# Patient Record
Sex: FEMALE | Race: OTHER | HISPANIC OR LATINO | URBAN - METROPOLITAN AREA
[De-identification: names, ages, dates, MRNs, and addresses within clinical notes are randomized per-mention and may not be internally consistent; named-entity substitution may affect disease eponyms.]

---

## 2020-09-30 ENCOUNTER — EMERGENCY (EMERGENCY)
Facility: HOSPITAL | Age: 40
LOS: 1 days | Discharge: HOME | End: 2020-09-30
Attending: EMERGENCY MEDICINE | Admitting: EMERGENCY MEDICINE
Payer: MEDICAID

## 2020-09-30 VITALS
RESPIRATION RATE: 18 BRPM | HEIGHT: 63 IN | TEMPERATURE: 97 F | OXYGEN SATURATION: 100 % | DIASTOLIC BLOOD PRESSURE: 76 MMHG | SYSTOLIC BLOOD PRESSURE: 131 MMHG | HEART RATE: 67 BPM

## 2020-09-30 LAB
APTT BLD: 32.7 SEC — SIGNIFICANT CHANGE UP (ref 27–36.3)
BASE EXCESS BLDV CALC-SCNC: 3.3 MMOL/L — SIGNIFICANT CHANGE UP
BASOPHILS # BLD AUTO: 0.03 K/UL — SIGNIFICANT CHANGE UP (ref 0–0.2)
BASOPHILS NFR BLD AUTO: 0.3 % — SIGNIFICANT CHANGE UP (ref 0–2)
BLOOD GAS VENOUS - CREATININE: 0.68 MG/DL — SIGNIFICANT CHANGE UP (ref 0.5–1.3)
CHLORIDE BLDV-SCNC: 107 MMOL/L — SIGNIFICANT CHANGE UP (ref 96–108)
EOSINOPHIL # BLD AUTO: 0.13 K/UL — SIGNIFICANT CHANGE UP (ref 0–0.5)
EOSINOPHIL NFR BLD AUTO: 1.2 % — SIGNIFICANT CHANGE UP (ref 0–6)
GAS PNL BLDV: 136 MMOL/L — SIGNIFICANT CHANGE UP (ref 136–146)
GLUCOSE BLDV-MCNC: 103 MG/DL — HIGH (ref 70–99)
HCG SERPL-ACNC: < 5 MIU/ML — SIGNIFICANT CHANGE UP
HCO3 BLDV-SCNC: 25 MMOL/L — SIGNIFICANT CHANGE UP (ref 20–27)
HCT VFR BLD CALC: 40.8 % — SIGNIFICANT CHANGE UP (ref 34.5–45)
HCT VFR BLDV CALC: 41.3 % — SIGNIFICANT CHANGE UP (ref 34.5–45)
HGB BLD-MCNC: 12.7 G/DL — SIGNIFICANT CHANGE UP (ref 11.5–15.5)
HGB BLDV-MCNC: 13.4 G/DL — SIGNIFICANT CHANGE UP (ref 11.5–15.5)
IMM GRANULOCYTES NFR BLD AUTO: 0.3 % — SIGNIFICANT CHANGE UP (ref 0–1.5)
INR BLD: 1.07 — SIGNIFICANT CHANGE UP (ref 0.88–1.16)
LACTATE BLDV-MCNC: 1.4 MMOL/L — SIGNIFICANT CHANGE UP (ref 0.5–2)
LYMPHOCYTES # BLD AUTO: 2.44 K/UL — SIGNIFICANT CHANGE UP (ref 1–3.3)
LYMPHOCYTES # BLD AUTO: 22.8 % — SIGNIFICANT CHANGE UP (ref 13–44)
MCHC RBC-ENTMCNC: 26.8 PG — LOW (ref 27–34)
MCHC RBC-ENTMCNC: 31.1 % — LOW (ref 32–36)
MCV RBC AUTO: 86.3 FL — SIGNIFICANT CHANGE UP (ref 80–100)
MONOCYTES # BLD AUTO: 0.53 K/UL — SIGNIFICANT CHANGE UP (ref 0–0.9)
MONOCYTES NFR BLD AUTO: 4.9 % — SIGNIFICANT CHANGE UP (ref 2–14)
NEUTROPHILS # BLD AUTO: 7.56 K/UL — HIGH (ref 1.8–7.4)
NEUTROPHILS NFR BLD AUTO: 70.5 % — SIGNIFICANT CHANGE UP (ref 43–77)
NRBC # FLD: 0 K/UL — SIGNIFICANT CHANGE UP (ref 0–0)
PCO2 BLDV: 49 MMHG — SIGNIFICANT CHANGE UP (ref 41–51)
PH BLDV: 7.38 PH — SIGNIFICANT CHANGE UP (ref 7.32–7.43)
PLATELET # BLD AUTO: 339 K/UL — SIGNIFICANT CHANGE UP (ref 150–400)
PMV BLD: 9.7 FL — SIGNIFICANT CHANGE UP (ref 7–13)
PO2 BLDV: 25 MMHG — LOW (ref 35–40)
POTASSIUM BLDV-SCNC: 3.5 MMOL/L — SIGNIFICANT CHANGE UP (ref 3.4–4.5)
PROTHROM AB SERPL-ACNC: 12.3 SEC — SIGNIFICANT CHANGE UP (ref 10.6–13.6)
RBC # BLD: 4.73 M/UL — SIGNIFICANT CHANGE UP (ref 3.8–5.2)
RBC # FLD: 13.2 % — SIGNIFICANT CHANGE UP (ref 10.3–14.5)
SAO2 % BLDV: 38.3 % — LOW (ref 60–85)
WBC # BLD: 10.72 K/UL — HIGH (ref 3.8–10.5)
WBC # FLD AUTO: 10.72 K/UL — HIGH (ref 3.8–10.5)

## 2020-09-30 PROCEDURE — 99285 EMERGENCY DEPT VISIT HI MDM: CPT

## 2020-09-30 PROCEDURE — 93971 EXTREMITY STUDY: CPT | Mod: 26,LT

## 2020-09-30 RX ORDER — KETOROLAC TROMETHAMINE 30 MG/ML
30 SYRINGE (ML) INJECTION ONCE
Refills: 0 | Status: DISCONTINUED | OUTPATIENT
Start: 2020-09-30 | End: 2020-09-30

## 2020-09-30 RX ORDER — SODIUM CHLORIDE 9 MG/ML
1000 INJECTION INTRAMUSCULAR; INTRAVENOUS; SUBCUTANEOUS ONCE
Refills: 0 | Status: COMPLETED | OUTPATIENT
Start: 2020-09-30 | End: 2020-09-30

## 2020-09-30 RX ORDER — MORPHINE SULFATE 50 MG/1
4 CAPSULE, EXTENDED RELEASE ORAL ONCE
Refills: 0 | Status: DISCONTINUED | OUTPATIENT
Start: 2020-09-30 | End: 2020-09-30

## 2020-09-30 RX ADMIN — MORPHINE SULFATE 4 MILLIGRAM(S): 50 CAPSULE, EXTENDED RELEASE ORAL at 22:50

## 2020-09-30 RX ADMIN — SODIUM CHLORIDE 2000 MILLILITER(S): 9 INJECTION INTRAMUSCULAR; INTRAVENOUS; SUBCUTANEOUS at 22:50

## 2020-09-30 NOTE — ED PROVIDER NOTE - PSH
H/O umbilical hernia repair  2013  History of cosmetic surgery  2014- skin of arms/ lergs secondary to excessive weight loss  S/P breast implant, left    S/P breast implant, right    S/P cholecystectomy

## 2020-09-30 NOTE — ED PROVIDER NOTE - OBJECTIVE STATEMENT
39 y/o F w/ PMH SLE on injectable solumedrol biweekly, h/o Chiari malformation, h/o varicose veins for which she receives injections, complicated by thrombophlebitis p/w painful L lower  extremity s/p varicose vein injection 4 days ago. L foot w/ swelling and redness which radiates proximally. Denies any numbness, paresthesias, fever, chills, chest pain, shortness of breath or cough.

## 2020-09-30 NOTE — ED PROVIDER NOTE - LOWER EXTREMITY EXAM, LEFT
+edematous redness to L lower extremity, especially in foot and ankle. Area of redness and warmth on dorsum of foot w/ mild tracking of redness of the ankle. No sensory deficits.

## 2020-09-30 NOTE — ED PROVIDER NOTE - CLINICAL SUMMARY MEDICAL DECISION MAKING FREE TEXT BOX
40 y/o F w/ leg swelling. Likely cellulitis. Will obtain labs, ultrasound, inflammatory markers and likely CDU for antibiotics and monitoring.

## 2020-09-30 NOTE — ED PROVIDER NOTE - ATTENDING CONTRIBUTION TO CARE
I performed a history and physical exam of the patient and discussed their management with the PA. I reviewed the PA's note and agree with the documented findings and plan of care. I have edited as appropriate. My medical decision making and observations are found above.  Pt s/p post sclerotherapy for varicose veins to LLE presents with LLE swelling and erythema to dorsum of foot- DVT vs cellulitis- pending labs and duplex

## 2020-09-30 NOTE — ED ADULT NURSE NOTE - NSIMPLEMENTINTERV_GEN_ALL_ED
Implemented All Fall Risk Interventions:  [place] to call system. Call bell, personal items and telephone within reach. Instruct patient to call for assistance. Room bathroom lighting operational. Non-slip footwear when patient is off stretcher. Physically safe environment: no spills, clutter or unnecessary equipment. Stretcher in lowest position, wheels locked, appropriate side rails in place. Provide visual cue, wrist band, yellow gown, etc. Monitor gait and stability. Monitor for mental status changes and reorient to person, place, and time. Review medications for side effects contributing to fall risk. Reinforce activity limits and safety measures with patient and family.

## 2020-09-30 NOTE — ED ADULT TRIAGE NOTE - CHIEF COMPLAINT QUOTE
Pt states had shots done to L leg on Friday for treatment of varicose vein and DVT and since then has been having pain and swelling to LLE. Pt noted with edema to LLE with tenderness and difficulty ambulating. Denies SOB or chest pain

## 2020-09-30 NOTE — ED ADULT NURSE NOTE - OBJECTIVE STATEMENT
Pt is a 40 year old female reporting to the ED for L ankle pain since Saturday. Pt reports welling and increased pain when moving, denies trauma or injury to are. PMH of Lupus. Pt is AOX4. Pt denies chest pain or SOB. PT respirations even an unlabored. Pt appears to be comfortable, in NAD. Pt deneis fever, chills, n/v/d. Pt denies abdominal pain, dysuria, hematuria. 18 g iv placed in right ac, labs draw, pending review, will continue to monitor.

## 2020-10-01 LAB
ALBUMIN SERPL ELPH-MCNC: 4 G/DL — SIGNIFICANT CHANGE UP (ref 3.3–5)
ALP SERPL-CCNC: 96 U/L — SIGNIFICANT CHANGE UP (ref 40–120)
ALT FLD-CCNC: 20 U/L — SIGNIFICANT CHANGE UP (ref 4–33)
ANION GAP SERPL CALC-SCNC: 13 MMO/L — SIGNIFICANT CHANGE UP (ref 7–14)
APPEARANCE UR: SIGNIFICANT CHANGE UP
AST SERPL-CCNC: 19 U/L — SIGNIFICANT CHANGE UP (ref 4–32)
BACTERIA # UR AUTO: HIGH
BILIRUB SERPL-MCNC: 0.4 MG/DL — SIGNIFICANT CHANGE UP (ref 0.2–1.2)
BILIRUB UR-MCNC: NEGATIVE — SIGNIFICANT CHANGE UP
BLOOD UR QL VISUAL: SIGNIFICANT CHANGE UP
BUN SERPL-MCNC: 10 MG/DL — SIGNIFICANT CHANGE UP (ref 7–23)
CALCIUM SERPL-MCNC: 9.1 MG/DL — SIGNIFICANT CHANGE UP (ref 8.4–10.5)
CHLORIDE SERPL-SCNC: 103 MMOL/L — SIGNIFICANT CHANGE UP (ref 98–107)
CK MB BLD-MCNC: 1.1 — SIGNIFICANT CHANGE UP (ref 0–2.5)
CK MB BLD-MCNC: 2 NG/ML — SIGNIFICANT CHANGE UP (ref 1–4.7)
CK SERPL-CCNC: 178 U/L — HIGH (ref 25–170)
CO2 SERPL-SCNC: 24 MMOL/L — SIGNIFICANT CHANGE UP (ref 22–31)
COLOR SPEC: SIGNIFICANT CHANGE UP
CREAT SERPL-MCNC: 0.62 MG/DL — SIGNIFICANT CHANGE UP (ref 0.5–1.3)
GLUCOSE SERPL-MCNC: 104 MG/DL — HIGH (ref 70–99)
GLUCOSE UR-MCNC: NEGATIVE — SIGNIFICANT CHANGE UP
HYALINE CASTS # UR AUTO: SIGNIFICANT CHANGE UP
KETONES UR-MCNC: NEGATIVE — SIGNIFICANT CHANGE UP
LEUKOCYTE ESTERASE UR-ACNC: HIGH
NITRITE UR-MCNC: NEGATIVE — SIGNIFICANT CHANGE UP
PH UR: 6 — SIGNIFICANT CHANGE UP (ref 5–8)
POTASSIUM SERPL-MCNC: 3.6 MMOL/L — SIGNIFICANT CHANGE UP (ref 3.5–5.3)
POTASSIUM SERPL-SCNC: 3.6 MMOL/L — SIGNIFICANT CHANGE UP (ref 3.5–5.3)
PROT SERPL-MCNC: 7.6 G/DL — SIGNIFICANT CHANGE UP (ref 6–8.3)
PROT UR-MCNC: NEGATIVE — SIGNIFICANT CHANGE UP
RBC CASTS # UR COMP ASSIST: HIGH (ref 0–?)
SARS-COV-2 RNA SPEC QL NAA+PROBE: SIGNIFICANT CHANGE UP
SODIUM SERPL-SCNC: 140 MMOL/L — SIGNIFICANT CHANGE UP (ref 135–145)
SP GR SPEC: 1.01 — SIGNIFICANT CHANGE UP (ref 1–1.04)
SQUAMOUS # UR AUTO: SIGNIFICANT CHANGE UP
UROBILINOGEN FLD QL: NORMAL — SIGNIFICANT CHANGE UP
WBC UR QL: HIGH (ref 0–?)

## 2020-10-01 PROCEDURE — 73590 X-RAY EXAM OF LOWER LEG: CPT | Mod: 26,LT

## 2020-10-01 PROCEDURE — 73620 X-RAY EXAM OF FOOT: CPT | Mod: 26,LT

## 2020-10-01 PROCEDURE — 99218: CPT

## 2020-10-01 PROCEDURE — 73600 X-RAY EXAM OF ANKLE: CPT | Mod: 26,LT

## 2020-10-01 RX ORDER — SODIUM CHLORIDE 9 MG/ML
1000 INJECTION INTRAMUSCULAR; INTRAVENOUS; SUBCUTANEOUS
Refills: 0 | Status: DISCONTINUED | OUTPATIENT
Start: 2020-10-01 | End: 2020-10-04

## 2020-10-01 RX ORDER — KETOROLAC TROMETHAMINE 30 MG/ML
30 SYRINGE (ML) INJECTION EVERY 8 HOURS
Refills: 0 | Status: DISCONTINUED | OUTPATIENT
Start: 2020-10-01 | End: 2020-10-02

## 2020-10-01 RX ORDER — ONDANSETRON 8 MG/1
4 TABLET, FILM COATED ORAL ONCE
Refills: 0 | Status: COMPLETED | OUTPATIENT
Start: 2020-10-01 | End: 2020-10-01

## 2020-10-01 RX ORDER — ACETAMINOPHEN 500 MG
975 TABLET ORAL ONCE
Refills: 0 | Status: COMPLETED | OUTPATIENT
Start: 2020-10-01 | End: 2020-10-01

## 2020-10-01 RX ORDER — ESCITALOPRAM OXALATE 10 MG/1
20 TABLET, FILM COATED ORAL DAILY
Refills: 0 | Status: DISCONTINUED | OUTPATIENT
Start: 2020-10-01 | End: 2020-10-04

## 2020-10-01 RX ORDER — OXYCODONE AND ACETAMINOPHEN 5; 325 MG/1; MG/1
1 TABLET ORAL EVERY 6 HOURS
Refills: 0 | Status: DISCONTINUED | OUTPATIENT
Start: 2020-10-01 | End: 2020-10-01

## 2020-10-01 RX ADMIN — OXYCODONE AND ACETAMINOPHEN 1 TABLET(S): 5; 325 TABLET ORAL at 11:29

## 2020-10-01 RX ADMIN — Medication 975 MILLIGRAM(S): at 19:01

## 2020-10-01 RX ADMIN — Medication 30 MILLIGRAM(S): at 00:41

## 2020-10-01 RX ADMIN — Medication 30 MILLIGRAM(S): at 07:45

## 2020-10-01 RX ADMIN — OXYCODONE AND ACETAMINOPHEN 1 TABLET(S): 5; 325 TABLET ORAL at 12:09

## 2020-10-01 RX ADMIN — ONDANSETRON 4 MILLIGRAM(S): 8 TABLET, FILM COATED ORAL at 05:57

## 2020-10-01 RX ADMIN — SODIUM CHLORIDE 125 MILLILITER(S): 9 INJECTION INTRAMUSCULAR; INTRAVENOUS; SUBCUTANEOUS at 06:01

## 2020-10-01 RX ADMIN — Medication 100 MILLIGRAM(S): at 07:45

## 2020-10-01 RX ADMIN — OXYCODONE AND ACETAMINOPHEN 1 TABLET(S): 5; 325 TABLET ORAL at 05:57

## 2020-10-01 RX ADMIN — Medication 100 MILLIGRAM(S): at 00:41

## 2020-10-01 RX ADMIN — Medication 975 MILLIGRAM(S): at 20:06

## 2020-10-01 RX ADMIN — Medication 100 MILLIGRAM(S): at 16:18

## 2020-10-01 RX ADMIN — SODIUM CHLORIDE 125 MILLILITER(S): 9 INJECTION INTRAMUSCULAR; INTRAVENOUS; SUBCUTANEOUS at 01:52

## 2020-10-01 RX ADMIN — Medication 30 MILLIGRAM(S): at 20:06

## 2020-10-01 RX ADMIN — SODIUM CHLORIDE 125 MILLILITER(S): 9 INJECTION INTRAMUSCULAR; INTRAVENOUS; SUBCUTANEOUS at 23:30

## 2020-10-01 RX ADMIN — ESCITALOPRAM OXALATE 20 MILLIGRAM(S): 10 TABLET, FILM COATED ORAL at 01:52

## 2020-10-01 RX ADMIN — Medication 30 MILLIGRAM(S): at 19:02

## 2020-10-01 NOTE — ED CDU PROVIDER INITIAL DAY NOTE - OBJECTIVE STATEMENT
39 y/o F w/ PMH SLE on injectable solumedrol biweekly, h/o Chiari malformation, h/o varicose veins for which she receives injections, complicated by thrombophlebitis p/w painful L lower  extremity s/p varicose vein injection 4 days ago. L foot w/ swelling and redness which radiates proximally. Denies any numbness, paresthesias, fever, chills, chest pain, shortness of breath or cough.  Labs unremarkable. Venous duplex shows no acute DVT, however small amount of residual chronic thrombus in the distal (inferior) aspect of the left femoral vein. XR foot negative for gas. Pt likely with cellulitis s/p sclerotherapy. Will keep in CDU overnight for IV abx, pain control and observation.

## 2020-10-01 NOTE — ED CDU PROVIDER INITIAL DAY NOTE - PROGRESS NOTE DETAILS
PT slight decrease in erythema. will continue to monitor, IV abx overnight. Re assess and likely dc in am pt eating diner not distress, aware of plan. Endorse to night team

## 2020-10-01 NOTE — ED CDU PROVIDER INITIAL DAY NOTE - MEDICAL DECISION MAKING DETAILS
Pt likely with cellulitis s/p sclerotherapy. Will keep in CDU overnight for IV abx, pain control and observation.

## 2020-10-02 VITALS
OXYGEN SATURATION: 100 % | HEART RATE: 58 BPM | DIASTOLIC BLOOD PRESSURE: 66 MMHG | RESPIRATION RATE: 16 BRPM | TEMPERATURE: 98 F | SYSTOLIC BLOOD PRESSURE: 120 MMHG

## 2020-10-02 LAB
ANION GAP SERPL CALC-SCNC: 11 MMO/L — SIGNIFICANT CHANGE UP (ref 7–14)
APTT BLD: 35.3 SEC — SIGNIFICANT CHANGE UP (ref 27–36.3)
BASOPHILS # BLD AUTO: 0.02 K/UL — SIGNIFICANT CHANGE UP (ref 0–0.2)
BASOPHILS NFR BLD AUTO: 0.3 % — SIGNIFICANT CHANGE UP (ref 0–2)
BUN SERPL-MCNC: 14 MG/DL — SIGNIFICANT CHANGE UP (ref 7–23)
C3 SERPL-MCNC: 109.8 MG/DL — SIGNIFICANT CHANGE UP (ref 90–180)
C4 SERPL-MCNC: 12 MG/DL — SIGNIFICANT CHANGE UP (ref 10–40)
CALCIUM SERPL-MCNC: 8.1 MG/DL — LOW (ref 8.4–10.5)
CHLORIDE SERPL-SCNC: 105 MMOL/L — SIGNIFICANT CHANGE UP (ref 98–107)
CO2 SERPL-SCNC: 24 MMOL/L — SIGNIFICANT CHANGE UP (ref 22–31)
CREAT SERPL-MCNC: 0.61 MG/DL — SIGNIFICANT CHANGE UP (ref 0.5–1.3)
EOSINOPHIL # BLD AUTO: 0.14 K/UL — SIGNIFICANT CHANGE UP (ref 0–0.5)
EOSINOPHIL NFR BLD AUTO: 1.8 % — SIGNIFICANT CHANGE UP (ref 0–6)
GLUCOSE SERPL-MCNC: 87 MG/DL — SIGNIFICANT CHANGE UP (ref 70–99)
HBA1C BLD-MCNC: 5.3 % — SIGNIFICANT CHANGE UP (ref 4–5.6)
HCT VFR BLD CALC: 34.6 % — SIGNIFICANT CHANGE UP (ref 34.5–45)
HGB BLD-MCNC: 10.5 G/DL — LOW (ref 11.5–15.5)
IMM GRANULOCYTES NFR BLD AUTO: 0.3 % — SIGNIFICANT CHANGE UP (ref 0–1.5)
INR BLD: 1.49 — HIGH (ref 0.88–1.16)
LYMPHOCYTES # BLD AUTO: 3.13 K/UL — SIGNIFICANT CHANGE UP (ref 1–3.3)
LYMPHOCYTES # BLD AUTO: 40.2 % — SIGNIFICANT CHANGE UP (ref 13–44)
MCHC RBC-ENTMCNC: 26.9 PG — LOW (ref 27–34)
MCHC RBC-ENTMCNC: 30.3 % — LOW (ref 32–36)
MCV RBC AUTO: 88.5 FL — SIGNIFICANT CHANGE UP (ref 80–100)
MONOCYTES # BLD AUTO: 0.47 K/UL — SIGNIFICANT CHANGE UP (ref 0–0.9)
MONOCYTES NFR BLD AUTO: 6 % — SIGNIFICANT CHANGE UP (ref 2–14)
NEUTROPHILS # BLD AUTO: 4 K/UL — SIGNIFICANT CHANGE UP (ref 1.8–7.4)
NEUTROPHILS NFR BLD AUTO: 51.4 % — SIGNIFICANT CHANGE UP (ref 43–77)
NRBC # FLD: 0 K/UL — SIGNIFICANT CHANGE UP (ref 0–0)
PLATELET # BLD AUTO: 244 K/UL — SIGNIFICANT CHANGE UP (ref 150–400)
PMV BLD: 10.3 FL — SIGNIFICANT CHANGE UP (ref 7–13)
POTASSIUM SERPL-MCNC: 3.9 MMOL/L — SIGNIFICANT CHANGE UP (ref 3.5–5.3)
POTASSIUM SERPL-SCNC: 3.9 MMOL/L — SIGNIFICANT CHANGE UP (ref 3.5–5.3)
PROTHROM AB SERPL-ACNC: 16.8 SEC — HIGH (ref 10.6–13.6)
RBC # BLD: 3.91 M/UL — SIGNIFICANT CHANGE UP (ref 3.8–5.2)
RBC # FLD: 13.4 % — SIGNIFICANT CHANGE UP (ref 10.3–14.5)
SODIUM SERPL-SCNC: 140 MMOL/L — SIGNIFICANT CHANGE UP (ref 135–145)
THROMBIN TIME: 26.1 SEC — HIGH (ref 16–26)
WBC # BLD: 7.78 K/UL — SIGNIFICANT CHANGE UP (ref 3.8–10.5)
WBC # FLD AUTO: 7.78 K/UL — SIGNIFICANT CHANGE UP (ref 3.8–10.5)

## 2020-10-02 PROCEDURE — 99217: CPT

## 2020-10-02 PROCEDURE — 99284 EMERGENCY DEPT VISIT MOD MDM: CPT | Mod: GC

## 2020-10-02 RX ORDER — APIXABAN 2.5 MG/1
10 TABLET, FILM COATED ORAL EVERY 12 HOURS
Refills: 0 | Status: DISCONTINUED | OUTPATIENT
Start: 2020-10-02 | End: 2020-10-04

## 2020-10-02 RX ORDER — KETOROLAC TROMETHAMINE 30 MG/ML
30 SYRINGE (ML) INJECTION EVERY 6 HOURS
Refills: 0 | Status: DISCONTINUED | OUTPATIENT
Start: 2020-10-02 | End: 2020-10-02

## 2020-10-02 RX ORDER — OXYCODONE HYDROCHLORIDE 5 MG/1
1 TABLET ORAL
Qty: 12 | Refills: 0
Start: 2020-10-02

## 2020-10-02 RX ORDER — APIXABAN 2.5 MG/1
1 TABLET, FILM COATED ORAL
Qty: 60 | Refills: 0
Start: 2020-10-02 | End: 2020-10-31

## 2020-10-02 RX ADMIN — Medication 30 MILLIGRAM(S): at 06:11

## 2020-10-02 RX ADMIN — Medication 100 MILLIGRAM(S): at 00:07

## 2020-10-02 RX ADMIN — Medication 100 MILLIGRAM(S): at 08:00

## 2020-10-02 RX ADMIN — Medication 100 MILLIGRAM(S): at 15:23

## 2020-10-02 RX ADMIN — ESCITALOPRAM OXALATE 20 MILLIGRAM(S): 10 TABLET, FILM COATED ORAL at 13:12

## 2020-10-02 RX ADMIN — Medication 30 MILLIGRAM(S): at 18:06

## 2020-10-02 RX ADMIN — SODIUM CHLORIDE 125 MILLILITER(S): 9 INJECTION INTRAMUSCULAR; INTRAVENOUS; SUBCUTANEOUS at 06:00

## 2020-10-02 RX ADMIN — APIXABAN 10 MILLIGRAM(S): 2.5 TABLET, FILM COATED ORAL at 18:15

## 2020-10-02 RX ADMIN — Medication 30 MILLIGRAM(S): at 05:56

## 2020-10-02 NOTE — ED CDU PROVIDER SUBSEQUENT DAY NOTE - PHYSICAL EXAMINATION
CONSTITUTIONAL:  Well appearing, awake, alert, oriented to person, place, time/situation and in no apparent distress.  Pt. is objectively comfortable appearing and verbalizing in full, clear, effortless sentences.  ENMT: NC/AT.  Airway patent.  Nasal mucosa clear.  Moist mucous membranes.  Neck supple.  EYES:  Clear OU.  CARDIAC:  Normal rate, regular rhythm.  Heart sounds S1 S2.  No murmurs, gallops, or rubs.  RESPIRATORY:  Breath sounds clear and equal bilaterally.  No wheezes, no rales, no rhonchi.  GASTROINTESTINAL:  Abdomen soft, non-distended, non-tender.  No rebound, no guarding.  MUSCULOSKELETAL:  Range of motion is not limited.    SKIN:      PSYCHIATRIC:  Alert and oriented to person/place/time/situation.  Mood and affect WNL.  No apparent risk to self or others. CONSTITUTIONAL:  Well appearing, awake, alert, oriented to person, place, time/situation and in no apparent distress.  Pt. is objectively comfortable appearing and verbalizing in full, clear, effortless sentences.  ENMT: NC/AT.  Airway patent.  Nasal mucosa clear.  Moist mucous membranes.  Neck supple.  EYES:  Clear OU.  CARDIAC:  Normal rate, regular rhythm.  Heart sounds S1 S2.  No murmurs, gallops, or rubs.  RESPIRATORY:  Breath sounds clear and equal bilaterally.  No wheezes, no rales, no rhonchi.  GASTROINTESTINAL:  Abdomen soft, non-distended, non-tender.  No rebound, no guarding.  MUSCULOSKELETAL:  Range of motion is not limited.    SKIN:  overlying warmth with TTP, demarcated line with improving erythema, no crepitus,  PSYCHIATRIC:  Alert and oriented to person/place/time/situation.  Mood and affect WNL.  No apparent risk to self or others.

## 2020-10-02 NOTE — CONSULT NOTE ADULT - SUBJECTIVE AND OBJECTIVE BOX
SAMI NOAH  [id number]    HISTORY OF PRESENT ILLNESS:    40 Y F with a self reported Hx of SLE, varicose veins presented with LLE pain since 4 days.     She says the pain started after she got 'shots' as part of treatment for her varicose veins in her left lower leg. No fever, chills. She noticed swelling and erythema in her left lower leg (mid calf downwards, extending to the dorsum of her foot), pain progressed to a point that she has been having trouble walking.     During the w/u in the ER she had doppler of LLE that showed a chronic DVT in left femoral vein. The pt reports she has had a DVT diagnosis given to her in  this year (by the staff in the vein clinic) for her right leg, was given Aspirin then. The primary team is concerned if she has a systemic disease that is triggering recurrent DVTs.     The patient reports she was given a diagnosis of SLE in  by her PMD when she was having rashes on her body, especially her face. Was started on oral prednisone, never received any other medication. recently she was switched to intermittent Solumedrol injections? by her PMD and taken off prednisone. She denies alopecia, dry eyes, dry mouth, oral ulcers, joint pains. Says she has had a facial rash, photosensitivity. Reports her right hand fingers turn purple or blue sometimes (without triggers like cold).     Has had 3 viable and 3 miscarriages. One miscarriage at 3 months while the other two were 1 month.       PAST MEDICAL & SURGICAL HISTORY:  Varicose veins    PUD (peptic ulcer disease)    Cluster headache    H/O umbilical hernia repair      History of cosmetic surgery  2014- skin of arms/ lergs secondary to excessive weight loss    S/P breast implant, right    S/P breast implant, left    S/P cholecystectomy    Review of Systems:  Gen:  No fevers/chills, weight loss.   HEENT: No blurry vision, no difficulty swallowing  CVS: No chest pain/palpitations.   Resp: No SOB/wheezing.   GI: No N/V/C/D/abdominal pain  MSK: Pain in LLE.   Skin: No new rashes  Neuro: No headaches    MEDICATIONS  (STANDING):  clindamycin IVPB 600 milliGRAM(s) IV Intermittent every 8 hours  escitalopram 20 milliGRAM(s) Oral daily  sodium chloride 0.9%. 1000 milliLiter(s) (125 mL/Hr) IV Continuous <Continuous>    MEDICATIONS  (PRN):  ketorolac   Injectable 30 milliGRAM(s) IV Push every 6 hours PRN mild to moderate pain      Pertinent Medication history:  Home Medications:  aspirin 81 mg oral delayed release tablet: 1 tab(s) orally once a day (2015 17:51)  nortriptyline 50 mg oral capsule: 1 cap(s) orally once a day (at bedtime) (2015 17:51)  Percocet 5/325 oral tablet: 1 tab(s) orally every 6 hours prn moderate pain (2015 20:23)  SUMAtriptan 50 mg oral tablet: 1 tab(s) orally once a day, As needed, migraine headache (2015 17:51)      Allergies    No Known Allergies    Intolerances      SOCIAL HISTORY:  No Alcohol, recreational drugs or smoking    FAMILY HISTORY:  No pertinent family history in first degree relatives        Vital Signs Last 24 Hrs  T(C): 36 (02 Oct 2020 14:18), Max: 37.1 (02 Oct 2020 00:22)  T(F): 96.8 (02 Oct 2020 14:18), Max: 98.8 (02 Oct 2020 00:22)  HR: 55 (02 Oct 2020 14:18) (52 - 74)  BP: 109/65 (02 Oct 2020 14:18) (100/62 - 109/65)  BP(mean): --  RR: 17 (02 Oct 2020 14:18) (16 - 18)  SpO2: 99% (02 Oct 2020 14:18) (98% - 100%)    Physical Exam:  General: No apparent distress  HEENT: EOMI, MMM  CVS: +S1/S2, RRR  Resp: CTA b/l  GI: Soft, NT/ND  MSK: No signs of synovitis, swelling, tenderness, or reduced ROM in UE joints. LLE swelling seen, tenderness present around ankle and dorsum of left foot. Erythema present in dorsum aspect of left foot. Subcutaneous tender vein palpable in dorsum left foot.   Neuro: AAOx3.   Skin: No rashes    LABS:                        10.5   7.78  )-----------( 244      ( 02 Oct 2020 06:00 )             34.6     10-02    140  |  105  |  14  ----------------------------<  87  3.9   |  24  |  0.61    Ca    8.1<L>      02 Oct 2020 06:00    TPro  7.6  /  Alb  4.0  /  TBili  0.4  /  DBili  x   /  AST  19  /  ALT  20  /  AlkPhos  96  09-30    PT/INR - ( 30 Sep 2020 22:51 )   PT: 12.3 SEC;   INR: 1.07          PTT - ( 30 Sep 2020 22:51 )  PTT:32.7 SEC  Urinalysis Basic - ( 01 Oct 2020 01:20 )    Color: LIGHT YELLOW / Appearance: Lt TURBID / S.011 / pH: 6.0  Gluc: NEGATIVE / Ketone: NEGATIVE  / Bili: NEGATIVE / Urobili: NORMAL   Blood: TRACE / Protein: NEGATIVE / Nitrite: NEGATIVE   Leuk Esterase: SMALL / RBC: 6-10 / WBC 6-10   Sq Epi: MODERATE / Non Sq Epi: x / Bacteria: MODERATE      RADIOLOGY & ADDITIONAL STUDIES:    EXAM:  US DPLX LWR EXT VEINS LTD LT        PROCEDURE DATE:  Sep 30 2020         INTERPRETATION:  CLINICAL INFORMATION: Left lower extremity swelling.  Recent deep venous thrombosis in .    COMPARISON: None available.    TECHNIQUE: Duplex sonography of the LEFT LOWER extremity veins with color and spectral Doppler, with and without compression.    FINDINGS:  There is mild wall thickening in the distal (inferior) left femoral vein, which is incompletely compressible, likely reflecting small amount of residual chronic thrombus.  There is normal compressibility of the left common femoral vein, proximal (superior) and mid portions of the left femoral vein and left popliteal vein.  The contralateral common femoral vein is patent.  Doppler examination shows normal spontaneous and phasic flow.    No calf vein thrombosis is detected.    IMPRESSION:  No evidence of acute deep venous thrombosis in the left lower extremity.  Small amount of residual chronic thrombus in the distal (inferior) aspect of the left femoral vein.      KWAME REYES M.D., RADIOLOGY RESIDENT  This document has been electronically signed.  LIDIA PULLIAM M.D.,ATTENDING RADIOLOGIST  This document has been electronically signed. Oct  1 2020 12:05AM   SAMI NOAH  [id number]    HISTORY OF PRESENT ILLNESS:    40 Y F with a self reported Hx of SLE, varicose veins presented with LLE pain since 4 days.     She says the pain started after she got 'shots' as part of treatment for her varicose veins in her left lower leg. No fever, chills. She noticed swelling and erythema in her left lower leg (mid calf downwards, extending to the dorsum of her foot), pain progressed to a point that she has been having trouble walking.     During the w/u in the ER she had doppler of LLE that showed a chronic DVT in left femoral vein. The pt reports she has had a DVT diagnosis given to her in  this year (by the staff in the vein clinic) for her right leg, was given Aspirin then. The primary team is concerned if she has a systemic disease that is triggering recurrent DVTs.     The patient reports she was given a diagnosis of SLE in  by her PMD when she was having rashes on her body, especially her face. Was started on oral prednisone, never received any other medication. recently she was switched to intermittent Solumedrol injections? by her PMD and taken off prednisone. She denies alopecia, dry eyes, dry mouth, oral ulcers, joint pains. Says she has had a facial rash, photosensitivity. Reports her right hand fingers turn purple or blue sometimes (without triggers like cold).     Has had 3 viable pregnancies and 3 miscarriages. One miscarriage at 3 months while the other two were 1 month.     PAST MEDICAL & SURGICAL HISTORY:  Varicose veins    PUD (peptic ulcer disease)    Cluster headache    H/O umbilical hernia repair      History of cosmetic surgery  2014- skin of arms/ lergs secondary to excessive weight loss    S/P breast implant, right    S/P breast implant, left    S/P cholecystectomy    Review of Systems:  Gen:  No fevers/chills, weight loss.   HEENT: No blurry vision, no difficulty swallowing  CVS: No chest pain/palpitations.   Resp: No SOB/wheezing.   GI: No N/V/C/D/abdominal pain  MSK: Pain in LLE.   Skin: No new rashes  Neuro: No headaches    MEDICATIONS  (STANDING):  clindamycin IVPB 600 milliGRAM(s) IV Intermittent every 8 hours  escitalopram 20 milliGRAM(s) Oral daily  sodium chloride 0.9%. 1000 milliLiter(s) (125 mL/Hr) IV Continuous <Continuous>    MEDICATIONS  (PRN):  ketorolac   Injectable 30 milliGRAM(s) IV Push every 6 hours PRN mild to moderate pain      Pertinent Medication history:  Home Medications:  aspirin 81 mg oral delayed release tablet: 1 tab(s) orally once a day (2015 17:51)  nortriptyline 50 mg oral capsule: 1 cap(s) orally once a day (at bedtime) (2015 17:51)  Percocet 5/325 oral tablet: 1 tab(s) orally every 6 hours prn moderate pain (2015 20:23)  SUMAtriptan 50 mg oral tablet: 1 tab(s) orally once a day, As needed, migraine headache (2015 17:51)      Allergies    No Known Allergies    Intolerances      SOCIAL HISTORY:  No Alcohol, recreational drugs or smoking    FAMILY HISTORY:  No pertinent family history in first degree relatives        Vital Signs Last 24 Hrs  T(C): 36 (02 Oct 2020 14:18), Max: 37.1 (02 Oct 2020 00:22)  T(F): 96.8 (02 Oct 2020 14:18), Max: 98.8 (02 Oct 2020 00:22)  HR: 55 (02 Oct 2020 14:18) (52 - 74)  BP: 109/65 (02 Oct 2020 14:18) (100/62 - 109/65)  BP(mean): --  RR: 17 (02 Oct 2020 14:18) (16 - 18)  SpO2: 99% (02 Oct 2020 14:18) (98% - 100%)    Physical Exam:  General: No apparent distress  HEENT: EOMI, MMM  CVS: +S1/S2, RRR  Resp: CTA b/l  GI: Soft, NT/ND  MSK: No signs of synovitis, swelling, tenderness, or reduced ROM in UE joints. LLE swelling seen, tenderness present around ankle and dorsum of left foot. Erythema present in dorsum aspect of left foot. Subcutaneous tender vein palpable in dorsum left foot.   Neuro: AAOx3.   Skin: No rashes    LABS:                        10.5   7.78  )-----------( 244      ( 02 Oct 2020 06:00 )             34.6     10-02    140  |  105  |  14  ----------------------------<  87  3.9   |  24  |  0.61    Ca    8.1<L>      02 Oct 2020 06:00    TPro  7.6  /  Alb  4.0  /  TBili  0.4  /  DBili  x   /  AST  19  /  ALT  20  /  AlkPhos  96  09-30    PT/INR - ( 30 Sep 2020 22:51 )   PT: 12.3 SEC;   INR: 1.07          PTT - ( 30 Sep 2020 22:51 )  PTT:32.7 SEC  Urinalysis Basic - ( 01 Oct 2020 01:20 )    Color: LIGHT YELLOW / Appearance: Lt TURBID / S.011 / pH: 6.0  Gluc: NEGATIVE / Ketone: NEGATIVE  / Bili: NEGATIVE / Urobili: NORMAL   Blood: TRACE / Protein: NEGATIVE / Nitrite: NEGATIVE   Leuk Esterase: SMALL / RBC: 6-10 / WBC 6-10   Sq Epi: MODERATE / Non Sq Epi: x / Bacteria: MODERATE      RADIOLOGY & ADDITIONAL STUDIES:    EXAM:  US DPLX LWR EXT VEINS LTD LT        PROCEDURE DATE:  Sep 30 2020         INTERPRETATION:  CLINICAL INFORMATION: Left lower extremity swelling.  Recent deep venous thrombosis in .    COMPARISON: None available.    TECHNIQUE: Duplex sonography of the LEFT LOWER extremity veins with color and spectral Doppler, with and without compression.    FINDINGS:  There is mild wall thickening in the distal (inferior) left femoral vein, which is incompletely compressible, likely reflecting small amount of residual chronic thrombus.  There is normal compressibility of the left common femoral vein, proximal (superior) and mid portions of the left femoral vein and left popliteal vein.  The contralateral common femoral vein is patent.  Doppler examination shows normal spontaneous and phasic flow.    No calf vein thrombosis is detected.    IMPRESSION:  No evidence of acute deep venous thrombosis in the left lower extremity.  Small amount of residual chronic thrombus in the distal (inferior) aspect of the left femoral vein.      KWAME REYES M.D., RADIOLOGY RESIDENT  This document has been electronically signed.  LIDIA PULLIAM M.D.,ATTENDING RADIOLOGIST  This document has been electronically signed. Oct  1 2020 12:05AM

## 2020-10-02 NOTE — ED CDU PROVIDER DISPOSITION NOTE - ATTENDING CONTRIBUTION TO CARE
I performed a face-to-face evaluation of the patient and performed a history and physical examination. I agree with the history and physical examination.    Micaela: Lupus. H/o R LE DVT. Says she was only on ASA. Gets injections for leg varicose veins. P/w L leg swelling w/ dorsal foot erythema. Ankle FROM. US here shows L DVT. Unable to contact her PCP (who serves as her Rheum) or her varicose vein clinic ( [facility] [place]) to see if had prior DVT, if she has anticardiopin, lupus anticoagulant, and anti-phospholipid antibodies and beta glycoprotein, and discuss starting anticoagulation. Continued L leg elevation and Clinda; size of erythema has shrunk. Seen by THOMAS Rheum, who recommended studies to sort out pt's underlying Rheum condition. Will dc on anticoagulation and Clinda, w/ Rheum f/u.

## 2020-10-02 NOTE — ED CDU PROVIDER SUBSEQUENT DAY NOTE - PROGRESS NOTE DETAILS
Pt report pain in left foot, worse with walking, some pain traveling up leg Pt report pain in left foot, worse with walking, some pain traveling up leg. Pt reports no Pt report pain in left foot, worse with walking, some pain traveling up leg. Pt reports no previous h/o DVT in LLE, pt was only dx with DVT in RLE. given clinically picture, pain and swelling, dx likely acute dvt, with evidence of cellulitis on foot. due to no likely 2nd dx of dvt, rheum consulted. Recs labs as appreciated in chart. Pt seen by both rheum fellow and attg. Fellow will place orders in for labs to be drawn prior to dc. Pt ordered for apixaban and meds sent to pharmacy with clindamycin.  Pt offered admission for pain control. Pt reports pain is tolerable. wants dc home. This patient was signed out to me by CDU day team ISABEL Walsh and Dr. Hinojosa at 1900 hrs.  Day team sent Rx's for apixaban and clindamycin to pt's pharmacy; Rheum had advised labs which were being drawn at time of sign-out; plan discussed was that after specimens rec'd by lab, pt can be discharged home, to take oxycodone PRN for pain (NSAIDS not being Rx'd as pt will be on apixaban), and follow up as per discharge instructions.  No issues in the interim.  Labs showing rec'd by lab; pt will be discharged home as per discussed plan.

## 2020-10-02 NOTE — ED CDU PROVIDER DISPOSITION NOTE - PATIENT PORTAL LINK FT
You can access the FollowMyHealth Patient Portal offered by [facility] by registering at the following website: [email or web address]. By joining [facility]’s FollowMyHealth portal, you will also be able to view your health information using other applications (apps) compatible with our system.

## 2020-10-02 NOTE — CONSULT NOTE ADULT - ASSESSMENT
40 Y F with a self reported Hx of SLE, varicose veins presented with LLE pain since 4 days. US showed chronic DVT in left femoral vein. Pt has a self reported Hx of DVT in RLE this year. The primary team is concerned if she has a systemic disease that is triggering recurrent DVTs.     Problem List:  # LLE swelling with pain.  # Chronic DVT of left femoral vein.  # Self reported Hx of SLE.     Impression/Plan:     # LLE swelling with pain.  The patient has swelling and erythema of LLE. On exam a palpable cord is felt in the left dorsum foot.  Findings suspicious for superficial thrombophlebitis.  Recommend ICE packs, usually Abx are not necessary.  We suspect this might have been triggered by her recent invasive procedure of her veins.    # Chronic DVT of left femoral vein.  # Self reported Hx of SLE.   Is the chronic DVT in femoral vein due to manipulation of her veins or part of a prothrombotic state? unclear.  Pt reports she also had similar DVT on right leg, all these DVTs started after she started to get varicose vein treatment shots? more likely the DVTs could be related to venous manipulation rather than a true prothrombotic state.   The pt also reports a Hx of SLE, from her hx she does have some suspicious features of SLE.   Recommend SLE w/u: CARLYLE, ALLY, dsDNA, C3, C4, urine protein/creatinine ratio, Sjogren labs, APLS labs (especially important to check as she has DVTs and miscarriages).    If primary team discharges the pt, she can follow up her labs with her PCP and if abnormal she can make an appointment with our Rheumatology group at [address] [place] [phone] ([phone])    Recs not final, please wait for attending addendum    Pasha Sanderson MD  Rheum fellow PGY 4  Pager [phone]     40 Y F with a self reported Hx of SLE, varicose veins presented with LLE pain since 4 days. US showed chronic DVT in left femoral vein. Pt has a self reported Hx of DVT in RLE this year. The primary team is concerned if she has a systemic disease that is triggering recurrent DVTs.     Problem List:  # LLE swelling with pain.  # Chronic DVT of left femoral vein.  # Self reported Hx of SLE.     Impression/Plan:     # LLE swelling with pain.  The patient has swelling and erythema of LLE. On exam a palpable cord is felt in the left dorsum foot.  Findings suspicious for superficial thrombophlebitis.  Recommend ICE packs, usually Abx are not necessary.  We suspect this might have been triggered by her recent invasive procedure of her veins.    # Chronic DVT of left femoral vein.  # Self reported Hx of SLE.   Is the chronic DVT in femoral vein due to manipulation of her veins or part of a prothrombotic state? unclear.  Pt reports she also had similar DVT on right leg, all these DVTs started after she started to get varicose vein treatment shots? more likely the DVTs could be related to venous manipulation rather than a true prothrombotic state.   The pt also reports a Hx of SLE, from her hx she does have some suspicious features of SLE.   Recommend SLE w/u: CARLYLE, ALLY, dsDNA, C3, C4, urine protein/creatinine ratio, Sjogren labs, APLS labs (especially important to check as she has DVTs and miscarriages).    If primary team discharges the pt, she can make an appointment with our Rheumatology group at [address] [place] [phone] ([phone])     with Dr. Rex Sanderson MD  Rheum fellow PGY 4  Pager [phone]     40 Y F with a self reported Hx of SLE, varicose veins presented with LLE pain since 4 days. US showed chronic DVT in left femoral vein. Pt has a self reported Hx of DVT in RLE this year. The primary team is concerned if she has a systemic disease that is triggering recurrent DVTs.     Problem List:  # LLE swelling with pain.  # Chronic DVT of left femoral vein.  # Self reported Hx of SLE.     Impression/Plan:     # LLE swelling with pain.  The patient has swelling and erythema of LLE. On exam a palpable cord is felt in the left dorsum foot.  Findings suspicious for superficial thrombophlebitis.  Recommend ICE packs  We suspect this might have been triggered by her recent invasive procedure of her veins.    # Chronic DVT of left femoral vein.  # Self reported Hx of SLE.   Is the chronic DVT in femoral vein due to manipulation of her veins or part of a prothrombotic state? unclear.  Pt reports she also had similar DVT on right leg, all these DVTs started after she started to get varicose vein treatment shots? more likely the DVTs could be related to venous manipulation rather than a true prothrombotic state.   The pt also reports a Hx of SLE, from her hx she does have some suspicious features of SLE.   Recommend SLE w/u: CARLYLE, ALLY, dsDNA, C3, C4, urine protein/creatinine ratio, Sjogren labs, APLS labs (especially important to check as she has DVTs and miscarriages).    If primary team discharges the pt, she can make an appointment with our Rheumatology group at [address] [place] [address]. Call [phone] for appointment with Dr Rex ACEVEDO with Dr. Rex Sanderson MD  Rheum fellow PGY 4  Pager [phone]

## 2020-10-02 NOTE — ED CDU PROVIDER SUBSEQUENT DAY NOTE - ATTENDING CONTRIBUTION TO CARE
I performed a face-to-face evaluation of the patient and performed a history and physical examination. I agree with the history and physical examination.    Lupus. H/o R LE DVT. Says she was only on ASA. Gets injections for leg varicose veins. P/w L leg swelling w/ dorsal foot erythema. Ankle FROM. US here shows L DVT. Will contact her Rheum (and try to contact her varicose vein clinic ( [facility] [place]) to see if had prior DVT, if she has anticardiopin, lupus anticoagulant, and anti-phospholipid antibodies and beta glycoprotein, and discuss starting anticoagulation. Continue L leg elevation and Clinda; size of erythema has shrunk.

## 2020-10-02 NOTE — ED CDU PROVIDER DISPOSITION NOTE - CLINICAL COURSE
Micaela: Lupus. H/o R LE DVT. Says she was only on ASA. Gets injections for leg varicose veins. P/w L leg swelling w/ dorsal foot erythema. Ankle FROM. US here shows L DVT. Unable to contact her PCP (who serves as her Rheum) or her varicose vein clinic ( [facility] [place]) to see if had prior DVT, if she has anticardiopin, lupus anticoagulant, and anti-phospholipid antibodies and beta glycoprotein, and discuss starting anticoagulation. Continued L leg elevation and Clinda; size of erythema has shrunk. Seen by [facility] Rheum, who recommended studies to sort out pt's underlying Rheum condition. Will dc on anticoagulation and Clinda, w/ Rheum f/u.

## 2020-10-02 NOTE — ED CDU PROVIDER DISPOSITION NOTE - NSFOLLOWUPINSTRUCTIONS_ED_ALL_ED_FT
Follow up with your primary medical doctor within 2-3 days of ER discharge.    Call to schedule follow up with [facility] Rheumatology group at [address] [place] [phone] ([phone]); we advise follow up within the coming week.  Labs were sent prior to ED discharge as advised by the Rheumatology doctors that evaluated you in the ED; follow up with Rheumatology to review results and discuss further management.    If you need to find a doctor to follow up with, you may call the [facility] Patient Access Services helpline at [phone] to find names/contact #s for a practitioner (or specialist) to follow up with.    Bring your discharge papers / test results with you to your follow up appointment(s).    Rest / no strenuous activity.  Try to elevate affected extremity whenever possible to minimize swelling.  Stay well hydrated.    MEDICATIONS:  See attached medication sheet(s).    ***Return to the Emergency Department immediately if you experience any new / worsening symptoms or have any problems / concerns.***

## 2020-10-02 NOTE — CONSULT NOTE ADULT - ATTENDING COMMENTS
Pt seen/examined. Reported dx of SLE in the past, getting IV solumedrol infusions with PMD but not on any other rheumatologic meds at present. Reports R sided thrombotic event, chronic L sided thrombosis seen on imaging here, and presents with superficial thrombophlebitis in setting of recent varicose vein procedure. 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 Recommend full SLE and APLS serologies given hx. Stable for discharge from rheumatologic standpoint, recommend OP f/u with myself to review lab results.

## 2020-10-03 LAB
ANA TITR SER: NEGATIVE — SIGNIFICANT CHANGE UP
DSDNA AB FLD-ACNC: <0.2 — SIGNIFICANT CHANGE UP
ENA RNP IGG SER-ACNC: 0.4 — SIGNIFICANT CHANGE UP
ENA SM AB TITR SER: < 0.2 — SIGNIFICANT CHANGE UP
ENA SS-A AB FLD IA-ACNC: <0.2 — SIGNIFICANT CHANGE UP

## 2020-10-05 LAB
DRVVT SCREEN TO CONFIRM RATIO: 0.52 — SIGNIFICANT CHANGE UP (ref 0–1.2)
DSDNA AB SER-ACNC: <12 IU/ML — SIGNIFICANT CHANGE UP
NORMALIZED SCT PPP-RTO: 0.94 — SIGNIFICANT CHANGE UP (ref 0.86–1.2)

## 2020-10-06 LAB — CARDIOLIPIN IGA SER-MCNC: SIGNIFICANT CHANGE UP

## 2020-10-07 LAB
CARDIOLIPIN IGM SER-MCNC: 2.08 MPL — SIGNIFICANT CHANGE UP (ref 0–11)
CARDIOLIPIN IGM SER-MCNC: 7.01 GPL — SIGNIFICANT CHANGE UP (ref 0–23)

## 2020-12-14 NOTE — ED CDU PROVIDER INITIAL DAY NOTE - NS_OBSORDERDATE_ED_A_ED
Problem List Items Addressed This Visit        Endocrine/Metabolic    Class 2 severe obesity with serious comorbidity and body mass index (BMI) of 38.0 to 38.9 in adult (CMS/[facility])     Continue to follow LGI plan.    Continue to increase your physical activity as tolerated with knee pain and weather.     Look into [employer] with [employer] on you tube or fitness  on you tube.             Other    Body mass index 38.0-38.9, adult - Primary     See plan outlines in obesity section.              
01-Oct-2020 01:17

## 2022-08-18 NOTE — ED CDU PROVIDER INITIAL DAY NOTE - CPE EDP GASTRO NORM
Fabi MENA  from [facility] called and stated patient had a PVD screening and the results were abnormal and the patient has no symptoms  Normal 0.90  Patient Left leg 0.63  Patient Right leg 0.76    Fabi [phone] if you have any questions.    Thank you   normal...

## 2025-08-06 ENCOUNTER — NON-APPOINTMENT (OUTPATIENT)
Age: 45
End: 2025-08-06